# Patient Record
Sex: MALE | Race: OTHER | HISPANIC OR LATINO | Employment: UNEMPLOYED | URBAN - METROPOLITAN AREA
[De-identification: names, ages, dates, MRNs, and addresses within clinical notes are randomized per-mention and may not be internally consistent; named-entity substitution may affect disease eponyms.]

---

## 2023-03-17 ENCOUNTER — HOSPITAL ENCOUNTER (INPATIENT)
Facility: HOSPITAL | Age: 33
LOS: 4 days | Discharge: HOME | End: 2023-03-21
Attending: STUDENT IN AN ORGANIZED HEALTH CARE EDUCATION/TRAINING PROGRAM | Admitting: PSYCHIATRY & NEUROLOGY

## 2023-03-17 ENCOUNTER — HOSPITAL ENCOUNTER (EMERGENCY)
Facility: HOSPITAL | Age: 33
End: 2023-03-17
Attending: EMERGENCY MEDICINE

## 2023-03-17 VITALS
RESPIRATION RATE: 18 BRPM | OXYGEN SATURATION: 99 % | SYSTOLIC BLOOD PRESSURE: 138 MMHG | DIASTOLIC BLOOD PRESSURE: 84 MMHG | TEMPERATURE: 97.7 F | HEART RATE: 79 BPM | WEIGHT: 145 LBS

## 2023-03-17 DIAGNOSIS — Z00.8 MEDICAL CLEARANCE FOR PSYCHIATRIC ADMISSION: ICD-10-CM

## 2023-03-17 DIAGNOSIS — F39 MOOD DISORDER (HCC): Primary | ICD-10-CM

## 2023-03-17 DIAGNOSIS — R03.0 ELEVATED BLOOD PRESSURE READING: ICD-10-CM

## 2023-03-17 DIAGNOSIS — R45.851 SUICIDAL IDEATION: Primary | ICD-10-CM

## 2023-03-17 LAB
AMPHETAMINES SERPL QL SCN: NEGATIVE
BARBITURATES UR QL: NEGATIVE
BENZODIAZ UR QL: NEGATIVE
COCAINE UR QL: NEGATIVE
ETHANOL EXG-MCNC: 0 MG/DL
METHADONE UR QL: NEGATIVE
OPIATES UR QL SCN: NEGATIVE
OXYCODONE+OXYMORPHONE UR QL SCN: NEGATIVE
PCP UR QL: NEGATIVE
THC UR QL: NEGATIVE

## 2023-03-17 PROCEDURE — GZ59ZZZ INDIVIDUAL PSYCHOTHERAPY, PSYCHOPHYSIOLOGICAL: ICD-10-PCS | Performed by: PSYCHIATRY & NEUROLOGY

## 2023-03-17 PROCEDURE — GZHZZZZ GROUP PSYCHOTHERAPY: ICD-10-PCS | Performed by: PSYCHIATRY & NEUROLOGY

## 2023-03-17 RX ORDER — LORAZEPAM 2 MG/ML
2 INJECTION INTRAMUSCULAR EVERY 6 HOURS PRN
Status: DISCONTINUED | OUTPATIENT
Start: 2023-03-17 | End: 2023-03-21 | Stop reason: HOSPADM

## 2023-03-17 RX ORDER — DIPHENHYDRAMINE HYDROCHLORIDE 50 MG/ML
50 INJECTION INTRAMUSCULAR; INTRAVENOUS EVERY 6 HOURS PRN
Status: DISCONTINUED | OUTPATIENT
Start: 2023-03-17 | End: 2023-03-21 | Stop reason: HOSPADM

## 2023-03-17 RX ORDER — AMOXICILLIN 250 MG
1 CAPSULE ORAL DAILY PRN
Status: DISCONTINUED | OUTPATIENT
Start: 2023-03-17 | End: 2023-03-21 | Stop reason: HOSPADM

## 2023-03-17 RX ORDER — OLANZAPINE 5 MG/1
5 TABLET ORAL
Status: CANCELLED | OUTPATIENT
Start: 2023-03-17

## 2023-03-17 RX ORDER — HYDROXYZINE 50 MG/1
100 TABLET, FILM COATED ORAL
Status: DISCONTINUED | OUTPATIENT
Start: 2023-03-17 | End: 2023-03-21 | Stop reason: HOSPADM

## 2023-03-17 RX ORDER — ACETAMINOPHEN 325 MG/1
975 TABLET ORAL EVERY 6 HOURS PRN
Status: CANCELLED | OUTPATIENT
Start: 2023-03-17

## 2023-03-17 RX ORDER — MINERAL OIL AND PETROLATUM 150; 830 MG/G; MG/G
OINTMENT OPHTHALMIC 4 TIMES DAILY PRN
Status: DISCONTINUED | OUTPATIENT
Start: 2023-03-17 | End: 2023-03-21 | Stop reason: HOSPADM

## 2023-03-17 RX ORDER — BENZTROPINE MESYLATE 1 MG/1
1 TABLET ORAL 2 TIMES DAILY PRN
Status: CANCELLED | OUTPATIENT
Start: 2023-03-17

## 2023-03-17 RX ORDER — BISACODYL 10 MG
10 SUPPOSITORY, RECTAL RECTAL DAILY PRN
Status: DISCONTINUED | OUTPATIENT
Start: 2023-03-17 | End: 2023-03-21 | Stop reason: HOSPADM

## 2023-03-17 RX ORDER — OLANZAPINE 10 MG/1
5 INJECTION, POWDER, LYOPHILIZED, FOR SOLUTION INTRAMUSCULAR
Status: CANCELLED | OUTPATIENT
Start: 2023-03-17

## 2023-03-17 RX ORDER — HYDROXYZINE HYDROCHLORIDE 25 MG/1
100 TABLET, FILM COATED ORAL
Status: CANCELLED | OUTPATIENT
Start: 2023-03-17

## 2023-03-17 RX ORDER — ACETAMINOPHEN 325 MG/1
650 TABLET ORAL EVERY 6 HOURS PRN
Status: CANCELLED | OUTPATIENT
Start: 2023-03-17

## 2023-03-17 RX ORDER — ACETAMINOPHEN 325 MG/1
650 TABLET ORAL EVERY 4 HOURS PRN
Status: CANCELLED | OUTPATIENT
Start: 2023-03-17

## 2023-03-17 RX ORDER — TRAZODONE HYDROCHLORIDE 50 MG/1
50 TABLET ORAL
Status: CANCELLED | OUTPATIENT
Start: 2023-03-17

## 2023-03-17 RX ORDER — TRAZODONE HYDROCHLORIDE 50 MG/1
50 TABLET ORAL
Status: DISCONTINUED | OUTPATIENT
Start: 2023-03-17 | End: 2023-03-21 | Stop reason: HOSPADM

## 2023-03-17 RX ORDER — OLANZAPINE 10 MG/1
10 INJECTION, POWDER, LYOPHILIZED, FOR SOLUTION INTRAMUSCULAR
Status: CANCELLED | OUTPATIENT
Start: 2023-03-17

## 2023-03-17 RX ORDER — ACETAMINOPHEN 325 MG/1
650 TABLET ORAL EVERY 4 HOURS PRN
Status: DISCONTINUED | OUTPATIENT
Start: 2023-03-17 | End: 2023-03-21 | Stop reason: HOSPADM

## 2023-03-17 RX ORDER — MINERAL OIL AND PETROLATUM 150; 830 MG/G; MG/G
OINTMENT OPHTHALMIC 4 TIMES DAILY PRN
Status: CANCELLED | OUTPATIENT
Start: 2023-03-17

## 2023-03-17 RX ORDER — OLANZAPINE 10 MG/1
10 INJECTION, POWDER, LYOPHILIZED, FOR SOLUTION INTRAMUSCULAR
Status: DISCONTINUED | OUTPATIENT
Start: 2023-03-17 | End: 2023-03-21 | Stop reason: HOSPADM

## 2023-03-17 RX ORDER — HYDROXYZINE 50 MG/1
50 TABLET, FILM COATED ORAL
Status: DISCONTINUED | OUTPATIENT
Start: 2023-03-17 | End: 2023-03-21 | Stop reason: HOSPADM

## 2023-03-17 RX ORDER — BISACODYL 10 MG
10 SUPPOSITORY, RECTAL RECTAL DAILY PRN
Status: CANCELLED | OUTPATIENT
Start: 2023-03-17

## 2023-03-17 RX ORDER — MAGNESIUM HYDROXIDE/ALUMINUM HYDROXICE/SIMETHICONE 120; 1200; 1200 MG/30ML; MG/30ML; MG/30ML
30 SUSPENSION ORAL EVERY 4 HOURS PRN
Status: CANCELLED | OUTPATIENT
Start: 2023-03-17

## 2023-03-17 RX ORDER — OLANZAPINE 10 MG/1
10 TABLET ORAL
Status: DISCONTINUED | OUTPATIENT
Start: 2023-03-17 | End: 2023-03-21 | Stop reason: HOSPADM

## 2023-03-17 RX ORDER — BENZTROPINE MESYLATE 1 MG/ML
1 INJECTION INTRAMUSCULAR; INTRAVENOUS 2 TIMES DAILY PRN
Status: CANCELLED | OUTPATIENT
Start: 2023-03-17

## 2023-03-17 RX ORDER — HYDROXYZINE HYDROCHLORIDE 25 MG/1
50 TABLET, FILM COATED ORAL
Status: CANCELLED | OUTPATIENT
Start: 2023-03-17

## 2023-03-17 RX ORDER — ACETAMINOPHEN 325 MG/1
975 TABLET ORAL EVERY 6 HOURS PRN
Status: DISCONTINUED | OUTPATIENT
Start: 2023-03-17 | End: 2023-03-21 | Stop reason: HOSPADM

## 2023-03-17 RX ORDER — OLANZAPINE 5 MG/1
2.5 TABLET ORAL
Status: CANCELLED | OUTPATIENT
Start: 2023-03-17

## 2023-03-17 RX ORDER — BENZTROPINE MESYLATE 1 MG/1
1 TABLET ORAL 2 TIMES DAILY PRN
Status: DISCONTINUED | OUTPATIENT
Start: 2023-03-17 | End: 2023-03-21 | Stop reason: HOSPADM

## 2023-03-17 RX ORDER — OLANZAPINE 5 MG/1
10 TABLET ORAL
Status: CANCELLED | OUTPATIENT
Start: 2023-03-17

## 2023-03-17 RX ORDER — MAGNESIUM HYDROXIDE/ALUMINUM HYDROXICE/SIMETHICONE 120; 1200; 1200 MG/30ML; MG/30ML; MG/30ML
30 SUSPENSION ORAL EVERY 4 HOURS PRN
Status: DISCONTINUED | OUTPATIENT
Start: 2023-03-17 | End: 2023-03-21 | Stop reason: HOSPADM

## 2023-03-17 RX ORDER — HYDROXYZINE HYDROCHLORIDE 25 MG/1
25 TABLET, FILM COATED ORAL
Status: DISCONTINUED | OUTPATIENT
Start: 2023-03-17 | End: 2023-03-21 | Stop reason: HOSPADM

## 2023-03-17 RX ORDER — BISACODYL 10 MG
10 SUPPOSITORY, RECTAL RECTAL DAILY PRN
Status: DISCONTINUED | OUTPATIENT
Start: 2023-03-17 | End: 2023-03-17

## 2023-03-17 RX ORDER — HYDROXYZINE HYDROCHLORIDE 25 MG/1
25 TABLET, FILM COATED ORAL
Status: CANCELLED | OUTPATIENT
Start: 2023-03-17

## 2023-03-17 RX ORDER — AMOXICILLIN 250 MG
1 CAPSULE ORAL DAILY PRN
Status: CANCELLED | OUTPATIENT
Start: 2023-03-17

## 2023-03-17 RX ORDER — DIPHENHYDRAMINE HYDROCHLORIDE 50 MG/ML
50 INJECTION INTRAMUSCULAR; INTRAVENOUS EVERY 6 HOURS PRN
Status: CANCELLED | OUTPATIENT
Start: 2023-03-17

## 2023-03-17 RX ORDER — OLANZAPINE 10 MG/1
5 INJECTION, POWDER, LYOPHILIZED, FOR SOLUTION INTRAMUSCULAR
Status: DISCONTINUED | OUTPATIENT
Start: 2023-03-17 | End: 2023-03-21 | Stop reason: HOSPADM

## 2023-03-17 RX ORDER — BENZTROPINE MESYLATE 1 MG/ML
1 INJECTION INTRAMUSCULAR; INTRAVENOUS 2 TIMES DAILY PRN
Status: DISCONTINUED | OUTPATIENT
Start: 2023-03-17 | End: 2023-03-21 | Stop reason: HOSPADM

## 2023-03-17 RX ORDER — LORAZEPAM 2 MG/ML
2 INJECTION INTRAMUSCULAR EVERY 6 HOURS PRN
Status: CANCELLED | OUTPATIENT
Start: 2023-03-17

## 2023-03-17 RX ORDER — OLANZAPINE 2.5 MG/1
2.5 TABLET ORAL
Status: DISCONTINUED | OUTPATIENT
Start: 2023-03-17 | End: 2023-03-21 | Stop reason: HOSPADM

## 2023-03-17 RX ORDER — ACETAMINOPHEN 325 MG/1
650 TABLET ORAL EVERY 6 HOURS PRN
Status: DISCONTINUED | OUTPATIENT
Start: 2023-03-17 | End: 2023-03-21 | Stop reason: HOSPADM

## 2023-03-17 RX ORDER — OLANZAPINE 5 MG/1
5 TABLET ORAL
Status: DISCONTINUED | OUTPATIENT
Start: 2023-03-17 | End: 2023-03-21 | Stop reason: HOSPADM
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
## 2023-03-17 NOTE — ED NOTES
Roundtrip initiated at this time  [facility] P/U 2140    Transfer packet prepared  Patient informed of acceptance at this time  Patient verbalizes agreement with treatment at [facility]  Patient reports he does not currently have health insurance, and would benefit from help to secure CARLITOS Aguilar  Crisis Intervention Specialist II  03/17/23
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
## 2023-03-17 NOTE — ED NOTES
Patient is accepted at [facility]  Patient is accepted by Vero Gaona in Intake  Transportation is arranged with [facility]  Transportation is scheduled for 2130  Patient may go to the floor after 2130  Nurse report is to be called to [phone] prior to patient transfer      Wilfred Smallwood  Crisis Intervention Specialist II  03/17/23

## 2023-03-17 NOTE — EMTALA/ACUTE CARE TRANSFER
[facility]  [address] [facility] [address]  Dept: [phone]      EMTALA TRANSFER CONSENT    NAME Shaggy Mathew                                         1990                              MRN [id number]    I have been informed of my rights regarding examination, treatment, and transfer   by Dr Lynn Tello:  see psych    Risks:  worsening condition in route       Consent for Transfer:  I acknowledge that my medical condition has been evaluated and explained to me by the emergency department physician or other qualified medical person and/or my attending physician, who has recommended that I be transferred to the service of  Accepting Physician: Dr Colonel Hatchet at [address] Name, [address] : Janis CARL[facility]LE  The above potential benefits of such transfer, the potential risks associated with such transfer, and the probable risks of not being transferred have been explained to me, and I fully understand them  The doctor has explained that, in my case, the benefits of transfer outweigh the risks  I agree to be transferred  I authorize the performance of emergency medical procedures and treatments upon me in both transit and upon arrival at the receiving facility  Additionally, I authorize the release of any and all medical records to the receiving facility and request they be transported with me, if possible  I understand that the safest mode of transportation during a medical emergency is an ambulance and that the Hospital advocates the use of this mode of transport  Risks of traveling to the receiving facility by car, including absence of medical control, life sustaining equipment, such as oxygen, and medical personnel has been explained to me and I fully understand them  (DANIEL CORRECT BOX BELOW)  [  ]  I consent to the stated transfer and to be transported by ambulance/helicopter    [  ]  I consent to the stated transfer, but refuse transportation by ambulance and accept full responsibility for my transportation by car  I understand the risks of non-ambulance transfers and I exonerate the Hospital and its staff from any deterioration in my condition that results from this refusal     X___________________________________________    DATE  23  TIME________  Signature of patient or legally responsible individual signing on patient behalf           RELATIONSHIP TO PATIENT_________________________          Provider Certification    NAME Talita Cavanaugh                                         1990                              MRN [id number]    A medical screening exam was performed on the above named patient  Based on the examination:    Condition Necessitating Transfer The primary encounter diagnosis was Suicidal ideation  Diagnoses of Medical clearance for psychiatric admission and Elevated blood pressure reading were also pertinent to this visit  Patient Condition:  stable    Reason for Transfer:  SI/see psych    Transfer Requirements: [facility]   · Space available and qualified personnel available for treatment as acknowledged by Avi Ellington [phone]  · Agreed to accept transfer and to provide appropriate medical treatment as acknowledged by       Dr Gloria Mello  · Appropriate medical records of the examination and treatment of the patient are provided at the time of transfer   [address] [facility], [address] _______  · Transfer will be performed by qualified personnel from [address] [place] [address]  and appropriate transfer equipment as required, including the use of necessary and appropriate life support measures      Provider Certification: I have examined the patient and explained the following risks and benefits of being transferred/refusing transfer to the patient/family:         Based on these reasonable risks and benefits to the patient and/or the unborn child(paola), and based upon the information available at the time of the patient’s examination, I certify that the medical benefits reasonably to be expected from the provision of appropriate medical treatments at another medical facility outweigh the increasing risks, if any, to the individual’s medical condition, and in the case of labor to the unborn child, from effecting the transfer      X____________________________________________ DATE 03/17/23        TIME_______      ORIGINAL - SEND TO MEDICAL RECORDS   COPY - SEND WITH PATIENT DURING TRANSFER

## 2023-03-17 NOTE — ED NOTES
Full report given to [employer] RN at this time        Joe Art, HARJINDER  03/17/23 1949

## 2023-03-18 PROBLEM — Z00.8 MEDICAL CLEARANCE FOR PSYCHIATRIC ADMISSION: Status: ACTIVE | Noted: 2023-03-18

## 2023-03-18 PROBLEM — F39 MOOD DISORDER (HCC): Status: ACTIVE | Noted: 2023-03-18

## 2023-03-18 PROBLEM — Z72.0 TOBACCO USE: Status: ACTIVE | Noted: 2023-03-18

## 2023-03-18 LAB
25(OH)D3 SERPL-MCNC: 23.4 NG/ML (ref 30–100)
ALBUMIN SERPL BCP-MCNC: 4.3 G/DL (ref 3.5–5)
ALP SERPL-CCNC: 46 U/L (ref 34–104)
ALT SERPL W P-5'-P-CCNC: 12 U/L (ref 7–52)
ANION GAP SERPL CALCULATED.3IONS-SCNC: 4 MMOL/L (ref 4–13)
AST SERPL W P-5'-P-CCNC: 14 U/L (ref 13–39)
ATRIAL RATE: 61 BPM
BASOPHILS # BLD AUTO: 0.07 THOUSANDS/ÂΜL (ref 0–0.1)
BASOPHILS NFR BLD AUTO: 1 % (ref 0–1)
BILIRUB SERPL-MCNC: 0.61 MG/DL (ref 0.2–1)
BUN SERPL-MCNC: 17 MG/DL (ref 5–25)
CALCIUM SERPL-MCNC: 9.3 MG/DL (ref 8.4–10.2)
CHLORIDE SERPL-SCNC: 104 MMOL/L (ref 96–108)
CHOLEST SERPL-MCNC: 107 MG/DL
CO2 SERPL-SCNC: 30 MMOL/L (ref 21–32)
CREAT SERPL-MCNC: 1.06 MG/DL (ref 0.6–1.3)
EOSINOPHIL # BLD AUTO: 0.19 THOUSAND/ÂΜL (ref 0–0.61)
EOSINOPHIL NFR BLD AUTO: 3 % (ref 0–6)
ERYTHROCYTE [DISTWIDTH] IN BLOOD BY AUTOMATED COUNT: 11.9 % (ref 11.6–15.1)
EST. AVERAGE GLUCOSE BLD GHB EST-MCNC: 108 MG/DL
GFR SERPL CREATININE-BSD FRML MDRD: 93 ML/MIN/1.73SQ M
GLUCOSE P FAST SERPL-MCNC: 77 MG/DL (ref 65–99)
GLUCOSE SERPL-MCNC: 77 MG/DL (ref 65–140)
HBA1C MFR BLD: 5.4 %
HCT VFR BLD AUTO: 49 % (ref 36.5–49.3)
HDLC SERPL-MCNC: 50 MG/DL
HGB BLD-MCNC: 16.3 G/DL (ref 12–17)
IMM GRANULOCYTES # BLD AUTO: 0.03 THOUSAND/UL (ref 0–0.2)
IMM GRANULOCYTES NFR BLD AUTO: 0 % (ref 0–2)
LDLC SERPL CALC-MCNC: 44 MG/DL (ref 0–100)
LYMPHOCYTES # BLD AUTO: 2.1 THOUSANDS/ÂΜL (ref 0.6–4.47)
LYMPHOCYTES NFR BLD AUTO: 28 % (ref 14–44)
MCH RBC QN AUTO: 31.3 PG (ref 26.8–34.3)
MCHC RBC AUTO-ENTMCNC: 33.3 G/DL (ref 31.4–37.4)
MCV RBC AUTO: 94 FL (ref 82–98)
MONOCYTES # BLD AUTO: 0.56 THOUSAND/ÂΜL (ref 0.17–1.22)
MONOCYTES NFR BLD AUTO: 7 % (ref 4–12)
NEUTROPHILS # BLD AUTO: 4.63 THOUSANDS/ÂΜL (ref 1.85–7.62)
NEUTS SEG NFR BLD AUTO: 61 % (ref 43–75)
NONHDLC SERPL-MCNC: 57 MG/DL
NRBC BLD AUTO-RTO: 0 /100 WBCS
PLATELET # BLD AUTO: 213 THOUSANDS/UL (ref 149–390)
PMV BLD AUTO: 10.8 FL (ref 8.9–12.7)
POTASSIUM SERPL-SCNC: 4.2 MMOL/L (ref 3.5–5.3)
PROT SERPL-MCNC: 7.1 G/DL (ref 6.4–8.4)
QRS AXIS: 109 DEGREES
QRSD INTERVAL: 94 MS
QT INTERVAL: 388 MS
QTC INTERVAL: 390 MS
RBC # BLD AUTO: 5.21 MILLION/UL (ref 3.88–5.62)
SODIUM SERPL-SCNC: 138 MMOL/L (ref 135–147)
T WAVE AXIS: 115 DEGREES
TRIGL SERPL-MCNC: 64 MG/DL
TSH SERPL DL<=0.05 MIU/L-ACNC: 1.01 UIU/ML (ref 0.45–4.5)
VENTRICULAR RATE: 61 BPM
WBC # BLD AUTO: 7.58 THOUSAND/UL (ref 4.31–10.16)

## 2023-03-18 RX ADMIN — TRAZODONE HYDROCHLORIDE 50 MG: 50 TABLET ORAL at 21:30

## 2023-03-18 NOTE — ASSESSMENT & PLAN NOTE
• Admission labs reviewed, CBC, CMP, lipid panel acceptable  • Vitamin D 25-hydroxy, RPR, TSH, hemoglobin A1c labs pending  • Vitals stable  • UDS negative  • COVID-19 negative  • EKG reveals NSR, 61bpm   • Medically stable for continued inpatient psychiatric treatment based on available results  • Please contact [facility] with any questions or concerns
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
## 2023-03-18 NOTE — ED NOTES
Report received from previous RN  Patient resting comfortable in room  Awake and alert  Respirations appear even and non labored  Q15 minute safety checks continue, see paper charting for [address] observations  Patient aware he will be transported to [facility] at 2130  Will continue to monitor patient        Meg Martin RN  03/17/23 2019

## 2023-03-18 NOTE — TREATMENT PLAN
RN will meet with pt at least twice daily to assess any concerns and provide education on prescribed medications, diagnoses, and coping skills

## 2023-03-18 NOTE — ED NOTES
CTS arrived to transport patient to [facility] at this time   Belongings given to transport team       Dina Noel RN  03/17/23 2043

## 2023-03-18 NOTE — NURSING NOTE
[place]  Phone ( some scratches on back of phone upon arrival )  [place]    SECURITY 100 dollar bill TO SECURITY   SS CARD   PERMANENT RESIDENT ID  business card   [employer] above items in   [employer] debit [id number]  Laundry card    work ID   [employer] kors winter coat   Black baseball hat   Blue sweat pants ( strings inside)    No shirt or boxers upon arrival to unit

## 2023-03-18 NOTE — NURSING NOTE
Patient resting in bed, eyes closed, respirations regular, even and non-labored  Pt  arouses easily  No s/s of pain or distress noted  Personal items within reach  Q 7 minute safety checks maintained throughout shift
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
## 2023-03-18 NOTE — CONSULTS
[id number] [facility] 1990, 33 y o  male MRN: [id number]  Unit/Bed#: [facility] 260-02 Encounter: [id number]  Primary Care Provider: No primary care provider on file  Date and time admitted to hospital: 3/17/2023  9:27 PM    Inpatient consult for Medical Clearance for [address] patient  Consult performed by: Irma Guzmán PA-C  Consult ordered by: Pattie Hwang PA-C          Medical clearance for psychiatric admission  Assessment & Plan  • Admission labs reviewed, CBC, CMP, lipid panel acceptable  • Vitamin D 25-hydroxy, RPR, TSH, hemoglobin A1c labs pending  • Vitals stable  • UDS negative  • COVID-19 negative  • EKG reveals NSR, 61bpm   • Medically stable for continued inpatient psychiatric treatment based on available results  • Please contact SLIM with any questions or concerns      Tobacco use  Assessment & Plan  · Smokes  ·  on cessation  · Does not want NRT      VTE Prophylaxis:   Low Risk (Score 0-2) - Encourage Ambulation  Recommendations for Discharge:  · Discharge timeline per primary team   Routine follow-up with primary care provider   on cessation from tobacco use    Total Time Spent on Date of Encounter in care of patient: 25 minutes This time was spent on one or more of the following: performing physical exam; counseling and coordination of care; obtaining or reviewing history; documenting in the medical record; reviewing/ordering tests, medications or procedures; communicating with other healthcare professionals and discussing with patient's family/caregivers  Collaboration of Care: Were Recommendations Directly Discussed with Primary Treatment Team? No    History of Present Illness:  Shady Clark is a 33 y o  male who is originally admitted to the behavioral health service due to suicidal threats  We are consulted for management of chronic medical conditions     Patient denies any chronic medical conditions for which he takes daily medications  Patient should continue all prior to admission medications as prescribed by primary care provider/outpatient specialists  Patient denies recent travel, illness or sick contacts  Patient denies drinking or drug use  He does endorse smoking  Available admission lab work and vitals are acceptable  Patient feels a baseline physical health  Patient appears medically stable for inpatient psychiatric treatment at this time  Please contact SLIM with any medical questions or concerns if issues should arise  Review of Systems:  Review of Systems   Psychiatric/Behavioral: Positive for suicidal ideas  All other systems reviewed and are negative  Past Medical and Surgical History:   History reviewed  No pertinent past medical history  No past surgical history on file  Meds/Allergies:  PTA meds:   None       Allergies: No Known Allergies    Social History:  Marital Status: Single  Substance Use History:   Social History     Substance and Sexual Activity   Alcohol Use Yes    Comment: rarely drinks, last drink was 1 beer on New Years     Social History     Tobacco Use   Smoking Status Every Day   • Types: Cigars   • Start date: 2022   Smokeless Tobacco Never     Social History     Substance and Sexual Activity   Drug Use Never       Family History:  History reviewed  No pertinent family history  Physical Exam:   Vitals:   Blood Pressure: 122/72 (03/18/23 0735)  Pulse: 56 (03/18/23 0735)  Temperature: 97 8 °F (36 6 °C) (03/18/23 0735)  Temp Source: Tympanic (03/18/23 0735)  Respirations: 16 (03/18/23 0735)  Height: 5' 6" (167 6 cm) (03/17/23 2100)  Weight - Scale: 66 7 kg (147 lb) (03/17/23 2100)  SpO2: 99 % (03/17/23 2100)    Physical Exam  Vitals and nursing note reviewed  Constitutional:       General: He is awake  He is not in acute distress  HENT:      Head: Normocephalic and atraumatic  Cardiovascular:      Rate and Rhythm: Normal rate and regular rhythm  Heart sounds: No murmur heard  Pulmonary:      Effort: Pulmonary effort is normal       Breath sounds: Normal breath sounds  Abdominal:      General: There is no distension  Palpations: Abdomen is soft  Musculoskeletal:      Right lower leg: No edema  Left lower leg: No edema  Skin:     General: Skin is warm and dry  Neurological:      Mental Status: He is alert  Comments: CN II-XII grossly intact        Additional Data:   Lab Results:    Results from last 7 days   Lab Units 03/18/23  0612   WBC Thousand/uL 7 58   HEMOGLOBIN g/dL 16 3   HEMATOCRIT % 49 0   PLATELETS Thousands/uL 213   NEUTROS PCT % 61   LYMPHS PCT % 28   MONOS PCT % 7   EOS PCT % 3     Results from last 7 days   Lab Units 03/18/23  0612   SODIUM mmol/L 138   POTASSIUM mmol/L 4 2   CHLORIDE mmol/L 104   CO2 mmol/L 30   BUN mg/dL 17   CREATININE mg/dL 1 06   ANION GAP mmol/L 4   CALCIUM mg/dL 9 3   ALBUMIN g/dL 4 3   TOTAL BILIRUBIN mg/dL 0 61   ALK PHOS U/L 46   ALT U/L 12   AST U/L 14   GLUCOSE RANDOM mg/dL 77             No results found for: HGBA1C            Imaging: No pertinent imaging reviewed  No orders to display       EKG, Pathology, and Other Studies Reviewed on Admission:   · EKG: NSR  HR 61bpm,   ** Please Note: This note may have been constructed using a voice recognition system   **

## 2023-03-18 NOTE — TREATMENT PLAN
TREATMENT PLAN REVIEW - [address] [facility] 33 y o  1990 male MRN: [id number]    [address] [place] [address] Room / Bed: [facility] 260/[facility] 260-01 Encounter: [id number]          Admit Date/Time:  3/17/2023  9:27 PM    Treatment Team: Attending Provider: Arcelia Hancock MD; Consulting Physician: Catarina Gutiérrez MD; Registered Nurse: Danya Yao, RN; Patient Care Technician: [facility]; Charge Nurse: Dany Gary RN; Patient Care Technician: May Ladd; Registered Nurse: Colin Jaimes, RN; Registered Nurse: Matilde Gillespie, RN; Patient Care Technician: Radha Tam;  Registered Nurse: Billy Villafana RN; Patient Care Assistant: Adam Hurst    Diagnosis: Principal Problem:    Mood disorder [facility])  Active Problems:    Medical clearance for psychiatric admission    Tobacco use      Patient Strengths/Assets: average or above intelligence, capable of independent living, good physical health, patient is on a voluntary commitment, patient is willing to work on problems    Patient Barriers/Limitations: lack of stable employment, limited education, limited support system, poor insight    Short Term Goals: decrease in depressive symptoms, decrease in suicidal thoughts, decrease in self abusive behaviors, decrease in homicidal thoughts, decrease in level of agitation, mood stabilization, increase in group attendance, increase in socialization with peers on the unit, acceptance of need for psychiatric treatment, acceptance of psychiatric medications    Long Term Goals: improvement in depression, resolution of depressive symptoms, stabilization of mood, free of suicidal thoughts, free of homicidal thoughts, improved insight, acceptance of need for psychiatric medications, acceptance of need for psychiatric treatment, acceptance of need for psychiatric follow up after discharge, acceptance of psychiatric diagnosis, appropriate interaction with peers, appropriate interaction with family, stable living arrangements upon discharge, establishment of family support upon discharge    Progress Towards Goals: starting psychiatric medications as prescribed    Recommended Treatment: medication management, patient medication education, group therapy, milieu therapy, continued Behavioral Health psychiatric evaluation/assessment process    Treatment Frequency: daily medication monitoring, group and milieu therapy daily, monitoring through interdisciplinary rounds, monitoring through weekly patient care conferences    Expected Discharge Date:  TBD    Discharge Plan: return to previous living arrangement    Treatment Plan Created/Updated By: [place] C Holter, DO

## 2023-03-18 NOTE — PLAN OF CARE
Problem: SELF HARM/SUICIDALITY  Goal: Will have no self-injury during hospital stay  Description: INTERVENTIONS:  - Q 15 MINUTES: Routine safety checks  - Q WAKING SHIFT & PRN: Assess risk to determine if routine checks are adequate to maintain patient safety  - Encourage patient to participate actively in care by formulating a plan to combat response to suicidal ideation, identify supports and resources  Outcome: Progressing     Problem: DEPRESSION  Goal: Will be euthymic at discharge  Description: INTERVENTIONS:  - Administer medication as ordered  - Provide emotional support via 1:1 interaction with staff  - Encourage involvement in milieu/groups/activities  - Monitor for social isolation  Outcome: Progressing     Problem: SLEEP DISTURBANCE  Goal: Will exhibit normal sleeping pattern  Description: Interventions:  -  Assess the patients sleep pattern, noting recent changes  - Administer medication as ordered  - Decrease environmental stimuli, including noise, as appropriate during the night  - Encourage the patient to actively participate in unit groups and or exercise during the day to enhance ability to achieve adequate sleep at night  - Assess the patient, in the morning, encouraging a description of sleep experience  Outcome: Progressing

## 2023-03-18 NOTE — NURSING NOTE
#[id number]    Pt is resting in bed throughout the day  Reports feeling "so so", having some sadness  Denies anxiety or any SI, HI  Denies AVH or paranoia  Pt asking when they will discharged  Encouraged pt to follow up with treatment team and attending psychiatrist on Monday  Pt reports speaking with wife since admission  Denies further questions  Pt requesting to shower and provided with toiletries

## 2023-03-18 NOTE — PLAN OF CARE
Problem: Ineffective Coping  Goal: Participates in unit activities  Description: Interventions:  - Provide therapeutic environment   - Provide required programming   - Redirect inappropriate behaviors   Outcome: Not Progressing     Problem: SELF HARM/SUICIDALITY  Goal: Will have no self-injury during hospital stay  Description: INTERVENTIONS:  - Q 15 MINUTES: Routine safety checks  - Q WAKING SHIFT & PRN: Assess risk to determine if routine checks are adequate to maintain patient safety  - Encourage patient to participate actively in care by formulating a plan to combat response to suicidal ideation, identify supports and resources  Outcome: Progressing     Problem: DEPRESSION  Goal: Will be euthymic at discharge  Description: INTERVENTIONS:  - Administer medication as ordered  - Provide emotional support via 1:1 interaction with staff  - Encourage involvement in milieu/groups/activities  - Monitor for social isolation  Outcome: Progressing     Problem: SLEEP DISTURBANCE  Goal: Will exhibit normal sleeping pattern  Description: Interventions:  -  Assess the patients sleep pattern, noting recent changes  - Administer medication as ordered  - Decrease environmental stimuli, including noise, as appropriate during the night  - Encourage the patient to actively participate in unit groups and or exercise during the day to enhance ability to achieve adequate sleep at night  - Assess the patient, in the morning, encouraging a description of sleep experience  Outcome: Progressing     Problem: Nutrition/Hydration-ADULT  Goal: Nutrient/Hydration intake appropriate for improving, restoring or maintaining nutritional needs  Description: Monitor and assess patient's nutrition/hydration status for malnutrition  Collaborate with interdisciplinary team and initiate plan and interventions as ordered  Monitor patient's weight and dietary intake as ordered or per policy  Utilize nutrition screening tool and intervene as necessary  Determine patient's food preferences and provide high-protein, high-caloric foods as appropriate       INTERVENTIONS:  - Monitor oral intake, urinary output, labs, and treatment plans  - Assess nutrition and hydration status and recommend course of action  - Evaluate amount of meals eaten  - Assist patient with eating if necessary   - Allow adequate time for meals  - Recommend/ encourage appropriate diets, oral nutritional supplements, and vitamin/mineral supplements  - Order, calculate, and assess calorie counts as needed  - Recommend, monitor, and adjust tube feedings and TPN/PPN based on assessed needs  - Assess need for intravenous fluids  - Provide specific nutrition/hydration education as appropriate  - Include patient/family/caregiver in decisions related to nutrition  Outcome: Progressing
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
## 2023-03-19 LAB — TREPONEMA PALLIDUM IGG+IGM AB [PRESENCE] IN SERUM OR PLASMA BY IMMUNOASSAY: NORMAL

## 2023-03-19 RX ADMIN — TRAZODONE HYDROCHLORIDE 50 MG: 50 TABLET ORAL at 21:32

## 2023-03-19 NOTE — CMS CERTIFICATION NOTE
Recertification: Based upon physical, mental and social evaluations, I certify that inpatient psychiatric services continue to be medically necessary for this patient for a duration of greater than 2 midnights for the treatment of  Mood disorder ([facility] )    [place] C Holter

## 2023-03-19 NOTE — TREATMENT TEAM
03/19/23 0800   Team Meeting   Meeting Type Daily Rounds   Team Members Present   Team Members Present Physician;Nurse   Physician Team Member Dr Clemente Chambers Team Member Maria Teresa   Patient/Family Present   Patient Present No   Patient's Family Present No       Daily Rounds: Pt generally seclusive to room  Reports mood is so-so  Admits to depression, denies SI  Expresses readiness for discharge
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
## 2023-03-19 NOTE — NURSING NOTE
Pt OOB this afternoon, currently watching TV in Day Room  Pt asked if spoke with wife on the phone, pt states that they are "no more"  Have had multiple problems and that they are deciding to end the marriage  Pt denies being anxious, some sadness  Denies SANDEEP, HI, NOEL

## 2023-03-19 NOTE — NURSING NOTE
[place]  Phone ( some scratches on back of phone upon arrival )  [place]    SECURITY 100 dollar bill TO SECURITY   SS CARD   PERMANENT RESIDENT ID  business card   [employer] envelope above items in   [employer] debit [id number]  Laundry card    work ID   [employer] kors winter coat   Black baseball hat   Blue sweat pants ( strings inside)     No shirt or boxers upon arrival to unit       Bedside  White long sleeve  Black sweatshirt  [employer] blue pants  [employer] joggers

## 2023-03-19 NOTE — PLAN OF CARE
Problem: SELF HARM/SUICIDALITY  Goal: Will have no self-injury during hospital stay  Description: INTERVENTIONS:  - Q 15 MINUTES: Routine safety checks  - Q WAKING SHIFT & PRN: Assess risk to determine if routine checks are adequate to maintain patient safety  - Encourage patient to participate actively in care by formulating a plan to combat response to suicidal ideation, identify supports and resources  Outcome: Progressing     Problem: DEPRESSION  Goal: Will be euthymic at discharge  Description: INTERVENTIONS:  - Administer medication as ordered  - Provide emotional support via 1:1 interaction with staff  - Encourage involvement in milieu/groups/activities  - Monitor for social isolation  Outcome: Progressing     Problem: SLEEP DISTURBANCE  Goal: Will exhibit normal sleeping pattern  Description: Interventions:  -  Assess the patients sleep pattern, noting recent changes  - Administer medication as ordered  - Decrease environmental stimuli, including noise, as appropriate during the night  - Encourage the patient to actively participate in unit groups and or exercise during the day to enhance ability to achieve adequate sleep at night  - Assess the patient, in the morning, encouraging a description of sleep experience  Outcome: Progressing     Problem: Ineffective Coping  Goal: Participates in unit activities  Description: Interventions:  - Provide therapeutic environment   - Provide required programming   - Redirect inappropriate behaviors   Outcome: Progressing     Problem: Nutrition/Hydration-ADULT  Goal: Nutrient/Hydration intake appropriate for improving, restoring or maintaining nutritional needs  Description: Monitor and assess patient's nutrition/hydration status for malnutrition  Collaborate with interdisciplinary team and initiate plan and interventions as ordered  Monitor patient's weight and dietary intake as ordered or per policy  Utilize nutrition screening tool and intervene as necessary  Determine patient's food preferences and provide high-protein, high-caloric foods as appropriate       INTERVENTIONS:  - Monitor oral intake, urinary output, labs, and treatment plans  - Assess nutrition and hydration status and recommend course of action  - Evaluate amount of meals eaten  - Assist patient with eating if necessary   - Allow adequate time for meals  - Recommend/ encourage appropriate diets, oral nutritional supplements, and vitamin/mineral supplements  - Order, calculate, and assess calorie counts as needed  - Recommend, monitor, and adjust tube feedings and TPN/PPN based on assessed needs  - Assess need for intravenous fluids  - Provide specific nutrition/hydration education as appropriate  - Include patient/family/caregiver in decisions related to nutrition  Outcome: Progressing

## 2023-03-19 NOTE — NURSING NOTE
Pt requested prn medication for insomnia, prn Trazodone 50 mg administered at 2130  At this time, 2230, pt is observed lying in bed with eyes closed  Medication effective

## 2023-03-19 NOTE — PROGRESS NOTES
Psychiatric Progress Note - Department of [address] 33 y o  male MRN: [id number]  Unit/Bed#: [facility] 260-01 Encounter: [id number]    ASSESSMENT & PLAN     Principal Problem:    Mood disorder ([address] )  Active Problems:    Medical clearance for psychiatric admission    Tobacco use      • Continue to promote patient participation in therapeutic milieu  • Continue medical management per medicine  • Continue previously prescribed psychotropic medication regimen; see below  • Continue behavioral health checks q 7 minutes  • Continue vitals per behavioral health unit protocol  • Discharge date per primary team     SUBJECTIVE     Patient evaluated this a m  for continuity of care using Guatemalan translation, seeing as patient is predominantly Guatemalan-speaking  Patient was discussed at length with nursing and treatment team   Per nursing, it remains calm, cooperative although isolative at times in the milieu  no acute distress is noted throughout evaluation  Joseline Bobo denies suicidal/homicidal ideation; contracts for safety  Patient states that his mood is " so-so", admitting to slight depression, denying anxiety, although for his introduction of psychotropic medication spite lengthy psychoeducation and supportive therapy provided by this writer, remaining superficial relating to incident preceding present inpatient behavioral health admission, admitting to adequate appetite and sleep overnight  PSYCHIATRIC REVIEW OF SYSTEMS     Behavior over the last 24 hours:  unchanged  Sleep: normal  Appetite: normal  Medication side effects: No    REVIEW OF SYSTEMS   Review of systems: no complaints    OBJECTIVE     Vital signs in last 24 hours:  Temp:  [96 7 °F (35 9 °C)-97 7 °F (36 5 °C)] 96 7 °F (35 9 °C)  HR:  [61-68] 68  Resp:  [16-17] 17  BP: (116-128)/(70-74) 124/74    Laboratory results:    I have personally reviewed all pertinent laboratory/tests results    Most Recent Labs: Lab Results   Component Value Date    WBC 7 58 03/18/2023    RBC 5 21 03/18/2023    HGB 16 3 03/18/2023    HCT 49 0 03/18/2023     03/18/2023    RDW 11 9 03/18/2023    NEUTROABS 4 63 03/18/2023    SODIUM 138 03/18/2023    K 4 2 03/18/2023     03/18/2023    CO2 30 03/18/2023    BUN 17 03/18/2023    CREATININE 1 06 03/18/2023    GLUC 77 03/18/2023    GLUF 77 03/18/2023    CALCIUM 9 3 03/18/2023    AST 14 03/18/2023    ALT 12 03/18/2023    ALKPHOS 46 03/18/2023    TP 7 1 03/18/2023    ALB 4 3 03/18/2023    TBILI 0 61 03/18/2023    CHOLESTEROL 107 03/18/2023    HDL 50 03/18/2023    TRIG 64 03/18/2023    LDLCALC 44 03/18/2023    NONHDLC 57 03/18/2023    IPA3HXABGMLK 1 007 03/18/2023    HGBA1C 5 4 03/18/2023     03/18/2023     Labs in last 72 hours:   Recent Labs     03/18/23  0612   WBC 7 58   RBC 5 21   HGB 16 3   HCT 49 0      RDW 11 9   NEUTROABS 4 63   SODIUM 138   K 4 2      CO2 30   BUN 17   CREATININE 1 06   GLUC 77   GLUF 77   CALCIUM 9 3   AST 14   ALT 12   ALKPHOS 46   TP 7 1   ALB 4 3   TBILI 0 61   CHOLESTEROL 107   HDL 50   TRIG 64   LDLCALC 44   [id number] 1 007     Admission Labs:   Admission on 03/17/2023   Component Date Value   • Sodium 03/18/2023 138    • Potassium 03/18/2023 4 2    • Chloride 03/18/2023 104    • CO2 03/18/2023 30    • ANION GAP 03/18/2023 4    • BUN 03/18/2023 17    • Creatinine 03/18/2023 1 06    • Glucose 03/18/2023 77    • Glucose, Fasting 03/18/2023 77    • Calcium 03/18/2023 9 3    • AST 03/18/2023 14    • ALT 03/18/2023 12    • Alkaline Phosphatase 03/18/2023 46    • Total Protein 03/18/2023 7 1    • Albumin 03/18/2023 4 3    • Total Bilirubin 03/18/2023 0 61    • eGFR 03/18/2023 93    • WBC 03/18/2023 7 58    • RBC 03/18/2023 5 21    • Hemoglobin 03/18/2023 16 3    • Hematocrit 03/18/2023 49 0    • MCV 03/18/2023 94    • MCH 03/18/2023 31 3    • MCHC 03/18/2023 33 3    • RDW 03/18/2023 11 9    • MPV 03/18/2023 10 8    • Platelets 09/64/6063 213    • nRBC 03/18/2023 0    • Neutrophils Relative 03/18/2023 61    • Immat GRANS % 03/18/2023 0    • Lymphocytes Relative 03/18/2023 28    • Monocytes Relative 03/18/2023 7    • Eosinophils Relative 03/18/2023 3    • Basophils Relative 03/18/2023 1    • Neutrophils Absolute 03/18/2023 4 63    • Immature Grans Absolute 03/18/2023 0 03    • Lymphocytes Absolute 03/18/2023 2 10    • Monocytes Absolute 03/18/2023 0 56    • Eosinophils Absolute 03/18/2023 0 19    • Basophils Absolute 03/18/2023 0 07    • Cholesterol 03/18/2023 107    • Triglycerides 03/18/2023 64    • HDL, Direct 03/18/2023 50    • LDL Calculated 03/18/2023 44    • Non-HDL-Chol (CHOL-HDL) 03/18/2023 57    • TSH 3RD GENERATON 03/18/2023 1 007    • Hemoglobin A1C 03/18/2023 5 4    • EAG 03/18/2023 108    • Vit D, 25-Hydroxy 03/18/2023 23 4 (L)    • Ventricular Rate 03/18/2023 61    • Atrial Rate 03/18/2023 61    • QRSD Interval 03/18/2023 94    • QT Interval 03/18/2023 388    • QTC Interval 03/18/2023 390    • QRS Axis 03/18/2023 109    • T Wave Axis 03/18/2023 115        Behavioral Health Medications:   all current active meds have been reviewed, continue current psychiatric medications and current meds:   Current Facility-Administered Medications   Medication Dose Route Frequency   • acetaminophen (TYLENOL) tablet 650 mg  650 mg Oral Q6H PRN   • acetaminophen (TYLENOL) tablet 650 mg  650 mg Oral Q4H PRN   • acetaminophen (TYLENOL) tablet 975 mg  975 mg Oral Q6H PRN   • aluminum-magnesium hydroxide-simethicone (MYLANTA) oral suspension 30 mL  30 mL Oral Q4H PRN   • artificial tear (LUBRIFRESH P M ) ophthalmic ointment   Both Eyes 4x Daily PRN   • benztropine (COGENTIN) injection 1 mg  1 mg Intramuscular BID PRN   • benztropine (COGENTIN) tablet 1 mg  1 mg Oral BID PRN   • bisacodyl (DULCOLAX) rectal suppository 10 mg  10 mg Rectal Daily PRN   • hydrOXYzine HCL (ATARAX) tablet 50 mg  50 mg Oral Q6H PRN Max 4/day    Or   • diphenhydrAMINE (BENADRYL) injection 50 mg  50 mg Intramuscular Q6H PRN   • hydrOXYzine HCL (ATARAX) tablet 100 mg  100 mg Oral Q6H PRN Max 4/day    Or   • LORazepam (ATIVAN) injection 2 mg  2 mg Intramuscular Q6H PRN   • hydrOXYzine HCL (ATARAX) tablet 25 mg  25 mg Oral Q6H PRN Max 4/day   • influenza vaccine, quadrivalent (FLUARIX) IM injection 0 5 mL  0 5 mL Intramuscular Once PRN   • magnesium hydroxide (MILK OF MAGNESIA) oral suspension 30 mL  30 mL Oral Daily PRN   • OLANZapine (ZyPREXA) tablet 10 mg  10 mg Oral Q3H PRN Max 3/day    Or   • OLANZapine (ZyPREXA) IM injection 10 mg  10 mg Intramuscular Q3H PRN Max 3/day   • OLANZapine (ZyPREXA) tablet 5 mg  5 mg Oral Q3H PRN Max 6/day    Or   • OLANZapine (ZyPREXA) IM injection 5 mg  5 mg Intramuscular Q3H PRN Max 6/day   • OLANZapine (ZyPREXA) tablet 2 5 mg  2 5 mg Oral Q3H PRN Max 8/day   • senna-docusate sodium (SENOKOT S) 8 6-50 mg per tablet 1 tablet  1 tablet Oral Daily PRN   • traZODone (DESYREL) tablet 50 mg  50 mg Oral HS PRN         Current Facility-Administered Medications   Medication Dose Route Frequency Provider Last Rate   • acetaminophen  650 mg Oral Q6H PRN Sparkle Catching, PA-C     • acetaminophen  650 mg Oral Q4H PRN [place] Catching, PA-C     • acetaminophen  975 mg Oral Q6H PRN Sparkle Catching, PA-C     • aluminum-magnesium hydroxide-simethicone  30 mL Oral Q4H PRN [place] Catching, PA-C     • artificial tear   Both Eyes 4x Daily PRN Sparkle Catching, PA-C     • benztropine  1 mg Intramuscular BID PRN Sparkle Catching, PA-C     • benztropine  1 mg Oral BID PRN Sparkle Catching, PA-C     • bisacodyl  10 mg Rectal Daily PRN Jolynn Lopez MD     • hydrOXYzine HCL  50 mg Oral Q6H PRN Max 4/day [place] Catching, PA-C      Or   • diphenhydrAMINE  50 mg Intramuscular Q6H PRN Sparkle Catching, PA-C     • hydrOXYzine HCL  100 mg Oral Q6H PRN Max 4/day Sparkle Goldstein PA-C      Or   • LORazepam  2 mg Intramuscular Q6H PRN Sparkle Goldstein PA-C     • hydrOXYzine HCL 25 mg Oral Q6H PRN Max 4/day [facility]     • influenza vaccine  0 5 mL Intramuscular Once PRN Ruddy Martinez MD     • magnesium hydroxide  30 mL Oral Daily PRN Ruddy Martinez MD     • OLANZapine  10 mg Oral Q3H PRN Max 3/day [facility], PA-C      Or   • OLANZapine  10 mg Intramuscular Q3H PRN Max 3/day [facility], PA-C     • OLANZapine  5 mg Oral Q3H PRN Max 6/day [facility], PA-UMA      Or   • OLANZapine  5 mg Intramuscular Q3H PRN Max 6/day [facility], PA-C     • OLANZapine  2 5 mg Oral Q3H PRN Max 8/day [facility], PA-C     • senna-docusate sodium  1 tablet Oral Daily PRN [facility]     • traZODone  50 mg Oral HS PRN [facility], PA-C         Risks, benefits and possible side effects of Medications:   Risks, benefits, and possible side effects of medications explained to patient and patient verbalizes understanding        Mental Status Evaluation:  Appearance:  age appropriate, casually dressed and disheveled   Behavior:  psychomotor retardation, superficial   Speech:  soft and scant   Mood:  depressed and dysthymic   Affect:  constricted   Language naming objects and repeating phrases   Thought Process:  goal directed   Thought Content:  normal   Perceptual Disturbances: None   Risk Potential: Suicidal Ideations none, Homicidal Ideations none and Potential for Aggression No   Sensorium:  person, place and time/date   Cognition:  recent and remote memory grossly intact   Consciousness:  alert and awake    Attention: attention span appeared shorter than expected for age   Insight:  limited   Judgment: limited   Intellect    Gait/Station: normal gait/station and normal balance   Motor Activity: no abnormal movements     Memory: Short and long term memory  fair     Progress Toward Goals: unchanged, as evidenced by their participation (or lack thereof) in individual, social and therapeutic milieu in addition to adherence to their medication regimen  Recommended Treatment:   See above for assessment and plan  Inpatient Psychiatric Certification: Estimated length of stay: More than 2 midnights  Note Share: This note was not shared with the patient due to reasonable likelihood of causing patient harm     This note has been constructed using a voice recognition system  There may be translation, syntax,  or grammatical errors  If you have any questions, please contact the dictating provider      [place] C Holter, DO

## 2023-03-20 RX ADMIN — TRAZODONE HYDROCHLORIDE 50 MG: 50 TABLET ORAL at 21:27
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
## 2023-03-20 NOTE — CASE MANAGEMENT
CM reached out to patient's sister in law, Beth Santiago, to discuss discharge  Beth Santiago informed CM that she would be able to  patient after 7 pm, once she leaves work  CM and Beth Santiago discussed any previous concerns with patient and possibly self-harm or harming others  Beth Santiago explained that patient did not have a history of SI or self-harm or harming others  These behaviors are new  At this time Beth Santiago did not express concerns with patient hurting himself or other, but would like patient to attend therapy once he moves to [place]
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
## 2023-03-20 NOTE — NURSING NOTE
Interpretor #[id number]    Pt denies SI/HI, depression  Pt states having mild anxiety related to worrying about his Father who was hospitalized and supposed to discharged from hospital today  Pt states he was hopeful to discharge today however understanding that he probably wont be until at least tomorrow  Pt remains mostly withdrawn to room  States he plans on living with brother and sister-in-law after discharge  Pt states his sister-in-law is supportive of patient

## 2023-03-20 NOTE — DISCHARGE INSTR - APPOINTMENTS
You are being discharged to your confirmed address of [address] [place] [address]  You will be contacted at your confirmed phone number of [phone]  Ace Hutchinson or Nellie, marnie Miles and Terell, will be calling you after your discharge, on the phone number that you provided  They will be available as an additional support, if needed  If you wish to speak with one of them, you may contact Arnav Zhou at [phone] or Magda Winslow at [phone]

## 2023-03-20 NOTE — PLAN OF CARE
Problem: DISCHARGE PLANNING - CARE MANAGEMENT  Goal: Discharge to post-acute care or home with appropriate resources  Description: INTERVENTIONS:  - Conduct assessment to determine patient/family and health care team treatment goals, and need for post-acute services based on payer coverage, community resources, and patient preferences, and barriers to discharge  - Address psychosocial, clinical, and financial barriers to discharge as identified in assessment in conjunction with the patient/family and health care team  - Arrange appropriate level of post-acute services according to patient’s   needs and preference and payer coverage in collaboration with the physician and health care team  - Communicate with and update the patient/family, physician, and health care team regarding progress on the discharge plan  - Arrange appropriate transportation to post-acute venues  Outcome: [place]  - CM reviewed Intake, MARY CARMEN's, and Treatment Plan with patient

## 2023-03-20 NOTE — NURSING NOTE
Patient requested and received Trazodone 50mg PRN for sleep at 2132  Medication effective, patient slept overnight without restlessness / distress observed
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
## 2023-03-20 NOTE — NURSING NOTE
This writer spoke with patient via  #[id number]  Thu Cough is pleasant upon approach, visualized ambulating in the hallways and socializing with select Israeli speaking peers in the dayroom  Patient denies current SI/HI/AH/VH, reports sleep and mood are both good, endorses feeling well and expresses readiness for discharge tomorrow  Thu Cough was encouraged to seek staff with any issues and/or concerns, verbalized understanding

## 2023-03-20 NOTE — DISCHARGE INSTR - OTHER ORDERS
The [facility] Mental [facility]ness ([facility]) offers various education & support groups for you & your family  For more information visit the website at    http://www Fastmobile/    Dial 2-1-1 to get connected/get help  Free, confidential information & referral available 24/7: Aging Services, Child & Youth Services, Counseling, Education/Training, Food/Shelter/Clothing, Health Services, Parenting, Substance Abuse, Support Groups, Volunteer Opportunities, & much more  Phone: 2-1-1 or 200-131-3460, Web: AGUSTIN LU081XRSU AGUEDA, Email: Yvette@google com      Support & Referral Helpline   Support and Referral Helpline is a 24-hour, 7 days-a-week, listening and referral service provided to all of [place]  Please call [phone] or tty [phone] to speak with one of our specialists  The helpline is possible through partnerships with the [employer]  The [employer] (formerly known as the [employer]) provides free and confidential emotional support to people in suicidal crisis or emotional distress 24 hours a day, 7 days a week, across the [place]  The Lifeline is comprised of a national network of over 200 local crisis centers, combining custom local care and resources with national standards and best practices  Mental Health Facilities in [place], [place]  Below is the full listing of mental health facilities in [place], [place]  Many of the treatment centers also provide co-occurring mental health and addiction treatment for dual diagnosis disorders for those who struggle with alcohol abuse or drug addiction   Mental health centers in [place], [place] provide residential and outpatient treatment for various mental health issues including eating disorders such as bulimia and anorexia, panic disorders, schizophrenia, ADHD, PTSD, mood disorders, depression, personality disorders, phobias and other mental illness  Call [phone] to get 24/7 help with treatment  SPONSORED AD  [facility] is a mental health treatment center in [place], [place], located at [address] [place] [address], [address] [place] [address] code area  [place] [address] provides residential treatment, partial hospitalization/day treatment and outpatient treatment  [place] [address] offers psychotropic medication, group therapy and individual psychotherapy to seniors 72 or older, children / adolescents and adults  [place] [address] also supports people with HIV or AIDS, people with serious mental illness and people requiring dual diagnosis treatment  Additional services at [place] [address] consist of supported housing, court-ordered outpatient treatment and intensive case management  Mark [address] [place] [address] [facility] is a mental health clinic in [place], [place], located at [address] offers outpatient treatment  [facility] provides group therapy, trauma therapy and individual psychotherapy to children / adolescents  Some other services provided by [facility] include family psychoeducation and case management  Faiza [address] DEPT [address] [facility] [place] is a mental health facility in [place], [place], located at [address] [place] [address], in the [address] zip code   [address] provides outpatient treatment and telemedicine/telehealth  [address] [place] [address] provides cognitive behavioral therapy, individual psychotherapy and psychotropic medication to adults, seniors 72 or older and young adults  [address] [place] [address] also supports patients with eating disorders, people with HIV or AIDS and veterans  Some other services available at [address] [place] [address] include integrated primary care services, diet and exercise counseling and suicide prevention services  [facility] is one of mental health facilities in [place], [place], located at [address] zip code  [address] [place], [place] [address]e [address] provides outpatient treatment  [facility] [address] [place] [address] provides psychotropic medication to young adults, adults and seniors 72 or older  [facility] [address] [place] [address] also supports people with serious mental illness  Additional services provided at [address] [place] [address] include case management  [facility] [place] From [place] [address] [facility] is a mental health clinic in [place], [place], located at [place], [place] zip code area  Outpatient [facility] provides outpatient treatment and partial hospitalization/day treatment  Outpatient [facility] offers couples/family therapy, cognitive behavioral therapy and dialectical behavior therapy to adults, young adults and children / adolescents   1124 [facility] also supports children/adolescents with serious emotional disturbance, transitional age young adults and veterans  Additional services offered at [place] [address] include suicide prevention services, family psychoeducation and court-ordered outpatient treatment  [facility] - 2 1 [place] From [facility], [address] ARSH Weiss is a mental health treatment clinic in [place], [place], located at [facility], in the [address] [place] [address] zip code  [facility] ARSH [facility] provides partial hospitalization/day treatment and outpatient treatment  [facility] ARSH Weiss provides couples/family therapy, individual psychotherapy and trauma therapy to adults, seniors 72 or older and young adults  Additional services provided by [facility] ARSH Weiss consist of family psychoeducation  [facility] - 3 2 [place] From [facility], [address] [place] [address] is a mental health facility in [place], [place], located at [address] [place] [address], [address] [place] [address] code area  [facility] provides partial hospitalization/day treatment and outpatient treatment  [facility] provides group therapy, cognitive behavioral therapy and activity therapy to seniors 65 or older, young adults and adults  [facility] also supports people requiring dual diagnosis treatment and people with serious mental illness  Additional services offered by [address] [place] [address] consist of case management, diet and exercise counseling and suicide prevention services    [facility] - 3 [address] [place] From [facility], [address] [place] [address] Healthcare Residential Program is a mental health treatment center in [facility], located at [address] [facility], part of the [address] zip code  [facility] Program provides residential treatment  [facility] Prudencio Vicente also offers psychotropic medication, integrated dual diagnosis disorder treatment and individual psychotherapy to young adults, seniors 72 or older and adults  [facility] [employer] Program supports LGBT, people with serious mental illness and people with PTSD  [facility] [facility] also provides integrated primary care services, diet and exercise counseling and intensive case management  905 [facility] - 3 7 [facility] From [facility], [place] [address] is a mental health clinic in [address] [place] [address], located at [facility], [address] [place] [address], [address] [place] [address], in the [address] zip code  [place] [address] provides outpatient treatment  [place] [address] offers couples/family therapy, behavior modification and dialectical behavior therapy to adults, young adults and seniors 72 or older  More services at [place] [address] include illness management and recovery, family psychoeducation and case management  34[address] [place] [address] 8 [place] From [facility] Health/consultation Center is a mental health center in [facility], located at [address] [place] [address], [address], [address] zip code  [address] [place] [address]/consultation Center provides outpatient treatment   [address] [place] [address]/consultation [place] offers individual psychotherapy, trauma therapy and psychotropic medication to seniors 72 or older and adults  [facility]/consultation [place] also supports LGBT, people requiring dual diagnosis treatment and seniors or older adults  More services available at [address] [place] [address]/[facility] include case management and psychosocial rehabilitation services  [place] - 3 8 [place] From [facility], [address] [place] [address] is a mental health treatment center in [place], [place], located at [facility], Floor 6, [facility] [place] [facility] zip code area  400 [facility] provides telemedicine/telehealth and outpatient treatment  [address] [place] [address] offers dialectical behavior therapy, group therapy and couples/family therapy to seniors 65 or older, adults and children / adolescents  [address] [place] [address] also supports LGBT, people with serious mental illness and transitional age young adults  Additional services at [address] [place] [address] consist of family psychoeducation, illness management and recovery and chronic disease/illness management  Shyam Aggarwal - 4 0 [place] From [facility], [address] is a mental health clinic in [facility], located at [address] [place], [place]  Andreina Zarate  offers outpatient treatment  Andreina Knox provides psychotropic medication, cognitive behavioral therapy and dialectical behavior therapy to young adults, seniors 72 or older and children / adolescents  Andreina Knox also supports people with HIV or AIDS  Some other services provided by Andreina Knox include family psychoeducation, case management and integrated primary care services    [facility] - 4 0 [place] From [facility], 2[facility] Counseling Service is a mental health facility in [place], [place], located at [address] [facility], in the [place] [address] zip code  [facility] Counsel[facility] Service provides outpatient treatment  [facility] Service provides trauma therapy, individual psychotherapy and cognitive behavioral therapy to seniors 72 or older, young adults and children / adolescents  Some other services available at [address] [place] [address] include family psychoeducation  [address] - 4 1 [place] From [facility], [address] [place] [address] is one of mental health facilities in [place], [place], located at [address] [facility], 2710 [facility], [address] [place] [address] zip code  3651 [facility] provides outpatient treatment  3651 [facility] provides individual psychotherapy, trauma therapy and group therapy to seniors 72 or older, adults and young adults  Additional services provided at 3651 [facility] include suicide prevention services and family psychoeducation  [address] - 4 1 [place] From [facility], [facility] is a mental health clinic in [address] [place] [address], located at [address], 4th Floor, \[facility] [place] [facility]\"" zip code area  [facility] provides outpatient treatment  [facility] offers psychotropic medication, cognitive behavioral therapy and group therapy to children / adolescents and young adults  [address] also supports transitional age young adults, patients with eating disorders and people with PTSD  Additional services offered at [place] [address] include family psychoeducation    Call ) [phone] to get 24/7 help with treatment  SPONSORED AD  Frequently Asked Questions  Your mental health is important to improving your life and making you healthy and happy for years to come  Finding the answers you need to common questions about mental health treatment in [facility], [place] H[place] will make it easier to take care of yourself  Some common questions and answers you may have to mental health include:  Is Inpatient Residential Treatment in [place] [address] Outpatient? For some patients, residential treatment in [facility] is a better option to help handle the mental illness  Inpatient treatment provides the patient with full-time recovery since they move into the facility for one to three months  They will eat there, sleep there, and meet others working on their mental health, making it very effective  Not all patients need inpatient treatment though  Inpatient treatment is best for severe mental health disorders, especially if nothing else has worked  Many minor mental illnesses like anxiety and depression do best with outpatient treatment  Is Mental Health Treatment in [facility] Covered By [employer]? Thanks to the Affordable Care Act, the major medical insurance providers will help with the costs of mental health treatment in [facility]  The major providers like Omid and Costa lew, along with other [employer] and even Medicaid, provide a level of coverage to the patients who need them  The exact amount of coverage will depend on the policy in place and the current premium  What Kind of Mental Health Disorders Can You Get Treatment For in [facility]? All types of mental health disorders can be treated in [facility]  Treating the mental health disorder is a great way to help the patient get better and not have to deal with the long-term implications   Some of the mental health disorders that can receive treatment include: bipolar disorder, depression (both minor and major), anxiety of all kinds, dissociative disorders, eating disorders like anorexia and bulimia, schizophrenia, PTSD, ADHD, mood disorders, binge eating disorder, BPD (borderline personality disorder), panic disorder and all phobias  Can Mental Health Clinics in [facility] Treat Dual Diagnosis? Mental health clinics in [facility] are able to help you with dual diagnosis treatment  The dual diagnosis happens when the patient has a mental health condition and an addiction to drugs or alcohol  Most mental health clinics can work on both the illness and addiction at the same time, but the patient should double-check before they choose one  If either disorder is left untreated, it sets the patient up for failure with getting better overall  If there is a specific mental health disorder and addiction that needs treated, the patient can look for one that can handle those specifically  Who Can Benefit from [address] in [facility]? Everyone can benefit from mental health treatment, whether they reside in [facility] or in other parts of [place] or elsewhere in the country  Teenagers, adolescents or adults with severe forms of mental illness will benefit the most, especially if they attend inpatient residential treatment  These severe mental conditions include bipolar disorder, depression, and schizophrenia  Less severe forms of mental illness deserve some treatment too  Those with anxiety or depression can consider outpatient therapy  This gives them time to work one on one with a therapist, learning more about the condition and how they can get better  How Much Do Mental Health Facilities in [facility]? The cost of mental health care in [facility] will depend on the type of facility you attend  Inpatient treatment is more in-depth and includes sleeping and eating arrangements, meaning it will cost more overall compared to outpatient treatment   On average, one month of inpatient treatment in [place] will cost $10,000 before insurance  There are also luxury facilities that can cost a lot more each month  For outpatient therapy, the costs are usually per hour, ranging from $65 to $250 per hour  Patients will often need one to two sessions a week per session  Insurance can help to lower these costs and make it more affordable  What Treatment Methodologies are Used During Mental Health Treatment in [place]? There are different methodologies that are used to help with mental health treatment in [place], but they can usually be categorized as somatic or psychotherapeutic  The somatic options are going to include drugs, electroconvulsive therapy, and other options that are going to stimulate the brain  If the patient is given an anti-depressant, then they will be given a somatic option, for example  There are also psychotherapeutic treatments  Therapy for marriages, family, or groups can fit in here, hypnotherapy, and behavior therapy techniques, like exposure therapy and relaxation therapy  For most patients, the most effective treatment will be one that can successfully merge both of these types of treatment into one  Some patients will need medication to help them manage their condition while also going through some form of therapy to help when the condition tries to show up again  ?

## 2023-03-20 NOTE — PROGRESS NOTES
Reviewed Diagnosis of: Principal Problem:    Mood disorder ([facility] [address] )  Active Problems:    Medical clearance for psychiatric admission    Tobacco use    Reviewed Short Term Goals of: decrease in depressive symptoms, decrease in suicidal thoughts, decrease in self abusive behaviors, decrease in homicidal thoughts, decrease in level of agitation, mood stabilization, increase in group attendance, increase in socialization with peers on the unit, acceptance of need for psychiatric treatment, acceptance of psychiatric medications    All parties in agreement         03/20/23 0110   Team Meeting   Meeting Type Tx Team Meeting   Team Members Present   Team Members Present Physician;Nurse;   Physician Team Member Flori Dubois Team Member [facility] Team Member Aliyah Zamudio   Patient/Family Present   Patient Present No   Patient's Family Present No

## 2023-03-21 VITALS
SYSTOLIC BLOOD PRESSURE: 124 MMHG | DIASTOLIC BLOOD PRESSURE: 98 MMHG | BODY MASS INDEX: 23.99 KG/M2 | RESPIRATION RATE: 18 BRPM | HEART RATE: 105 BPM | WEIGHT: 149.25 LBS | OXYGEN SATURATION: 99 % | HEIGHT: 66 IN | TEMPERATURE: 97.8 F

## 2023-03-21 PROBLEM — Z00.8 MEDICAL CLEARANCE FOR PSYCHIATRIC ADMISSION: Status: RESOLVED | Noted: 2023-03-18 | Resolved: 2023-03-21

## 2023-03-21 NOTE — NURSING NOTE
Patient appears to have slept overnight without interruption or periods of restlessness noted  Patient is still sleeping now  Q7 minute observational rounds maintained for promotion of patient safety

## 2023-03-21 NOTE — PLAN OF CARE
Problem: SELF HARM/SUICIDALITY  Goal: Will have no self-injury during hospital stay  Description: INTERVENTIONS:  - Q 15 MINUTES: Routine safety checks  - Q WAKING SHIFT & PRN: Assess risk to determine if routine checks are adequate to maintain patient safety  - Encourage patient to participate actively in care by formulating a plan to combat response to suicidal ideation, identify supports and resources  Outcome: Adequate for Discharge     Problem: DEPRESSION  Goal: Will be euthymic at discharge  Description: INTERVENTIONS:  - Administer medication as ordered  - Provide emotional support via 1:1 interaction with staff  - Encourage involvement in milieu/groups/activities  - Monitor for social isolation  Outcome: Adequate for Discharge     Problem: SLEEP DISTURBANCE  Goal: Will exhibit normal sleeping pattern  Description: Interventions:  -  Assess the patients sleep pattern, noting recent changes  - Administer medication as ordered  - Decrease environmental stimuli, including noise, as appropriate during the night  - Encourage the patient to actively participate in unit groups and or exercise during the day to enhance ability to achieve adequate sleep at night  - Assess the patient, in the morning, encouraging a description of sleep experience  Outcome: Adequate for Discharge     Problem: Ineffective Coping  Goal: Participates in unit activities  Description: Interventions:  - Provide therapeutic environment   - Provide required programming   - Redirect inappropriate behaviors   Outcome: Adequate for Discharge     Problem: Nutrition/Hydration-ADULT  Goal: Nutrient/Hydration intake appropriate for improving, restoring or maintaining nutritional needs  Description: Monitor and assess patient's nutrition/hydration status for malnutrition  Collaborate with interdisciplinary team and initiate plan and interventions as ordered  Monitor patient's weight and dietary intake as ordered or per policy   Utilize nutrition screening tool and intervene as necessary  Determine patient's food preferences and provide high-protein, high-caloric foods as appropriate  INTERVENTIONS:  - Monitor oral intake, urinary output, labs, and treatment plans  - Assess nutrition and hydration status and recommend course of action  - Evaluate amount of meals eaten  - Assist patient with eating if necessary   - Allow adequate time for meals  - Recommend/ encourage appropriate diets, oral nutritional supplements, and vitamin/mineral supplements  - Order, calculate, and assess calorie counts as needed  - Recommend, monitor, and adjust tube feedings and TPN/PPN based on assessed needs  - Assess need for intravenous fluids  - Provide specific nutrition/hydration education as appropriate  - Include patient/family/caregiver in decisions related to nutrition  Outcome: Adequate for Discharge     Problem: DISCHARGE PLANNING - CARE MANAGEMENT  Goal: Discharge to post-acute care or home with appropriate resources  Description: INTERVENTIONS:  - Conduct assessment to determine patient/family and health care team treatment goals, and need for post-acute services based on payer coverage, community resources, and patient preferences, and barriers to discharge  - Address psychosocial, clinical, and financial barriers to discharge as identified in assessment in conjunction with the patient/family and health care team  - Arrange appropriate level of post-acute services according to patient’s   needs and preference and payer coverage in collaboration with the physician and health care team  - Communicate with and update the patient/family, physician, and health care team regarding progress on the discharge plan  - Arrange appropriate transportation to post-acute venues  Outcome: Adequate for Discharge     Expressed readiness for discharge

## 2023-03-21 NOTE — CASE MANAGEMENT
CM spoke with patient's ex-girlfriend regarding discharge, Suzi Son stated that she can accept patient at her house until patient's sister in law from [place] picks him up later this evening

## 2023-03-21 NOTE — BH TRANSITION RECORD
Contact Information: If you have any questions, concerns, pended studies, tests and/or procedures, or emergencies regarding your inpatient behavioral health visit  Please contact [address] [place] [address] behavioral health unit [phone] and ask to speak to a , nurse or physician  A contact is available 24 hours/ 7 days a week at this number  Summary of Procedures Performed During your Stay:  Below is a list of major procedures performed during your hospital stay and a summary of results:  - Cardiac Procedures/Studies: ekg - Normal sinus rhythm with sinus arrhythmia  Pending Studies (From admission, onward)    None        Please follow up on the above pending studies with your PCP and/or referring provider

## 2023-03-21 NOTE — TREATMENT TEAM
03/21/23 0818   Activity/Group Checklist   Group Admission/Discharge   Attendance Attended   Attendance Duration (min) 0-15   Interactions Interacted appropriately   Affect/Mood Appropriate   Goals Achieved Identified relapse prevention strategies; Discussed coping strategies; Identified resources and support systems; Able to listen to others; Able to engage in interactions; Other (Comment)  (pt filled out the Pakistani version of relapse prevention plan with this therapist and Pakistani speaking CM to translate and be avaialable for support  no questions or concerns once cmplete   copy placed in DC folder)

## 2023-03-21 NOTE — CASE MANAGEMENT
CM received a call back from patient's sister in law, Catrachita Jacques stated that she can pick patient up from the hospital in the evening, once she is done working  CM informed Marlyn Kovacs that patient had plans to discharge to his previous residence in [facility], where his ex-girlfriend resides  Marlyn Kovacs stated that she will confirm with the ex-girlfriend to confirm that she would be accepting patient in her home and call CM back with updated information

## 2023-03-21 NOTE — PROGRESS NOTES
Patient is described as presenting with some anxiety due to his father being hospitalized  Patient is described as pleasant and cooperative  Patient denies SI/HI/AH/VH  Discharge Date: 3/21/23     03/21/23 0750   Team Meeting   Meeting Type Daily Rounds   Team Members Present   Team Members Present Physician;Nurse;; Other (Discipline and Name)   Physician Team Member Janneth/ MIAH Gifford/ MIAH Ward/ PA Students   Nursing Team Member Les/ Nursing Students   Care Management Team Member Wilber/ Gabrielle/ James Roy   Other (Discipline and Name) Art Therapist, Alfredo/ Art Therapy Intern   Patient/Family Present   Patient Present No   Patient's Family Present No

## 2023-03-21 NOTE — CASE MANAGEMENT
CM met with patient to discuss discharge  Patient was informed that his family would prefer to pick him up directly from the hospital  Patient stated that he did not want to wait and wanted to go to his ex-girlfriend's home instead and have the family pick him up from there  Patient was adamant that his family could not stop him from going to his old home with his ex girlfriend  CM contacted sister in law, Altagracia Elizalde, and explained the patient's plans  At this time Altagracia Elizalde is aware of the patient's plans and will coordinate with patient later this evening to pick him up from [facility] to take him to [place]

## 2023-03-21 NOTE — CASE MANAGEMENT
CM met with patient and discussed discharge plan  Patient informed CM that he will now discharge to [facility] ex-girlfriend's home in [facility] until his sister in law can pick him up from there this evening

## 2023-03-21 NOTE — TREATMENT TEAM
03/20/23 1230   Activity/Group Checklist   Group Life Skills   Attendance Attended   Attendance Duration (min) 0-15   Interactions Did not interact   Affect/Mood Calm   Goals Achieved Able to listen to others

## 2023-03-21 NOTE — TREATMENT TEAM
03/20/23 1500   Activity/Group Checklist   Group Other (Comment)  (goals group)   Attendance Attended   Attendance Duration (min) 0-15   Interactions Did not interact   Affect/Mood Calm   Goals Achieved Able to listen to others

## 2023-03-21 NOTE — NURSING NOTE
At 2127, patient requested Trazodone 50 mg PO for "sleep"  At [id number], patient appears to be asleep in bed  No
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
## 2023-03-21 NOTE — NURSING NOTE
Language line utilized [id number]  Patient expressed readiness for discharge  Plans to discharge to his girlfriends and later this evening leave to stay with his brother and sister in law  States no issues with the relationship between him and girlfriend and he is looking forward to going home and hugging her  Denies SI/HI and hallucinations  No medications upon discharge  Discussed suicide hotline 65 and reviewed crisis numbers  Smiling and pleasant in conversation  No questions or concerns

## 2023-09-06 NOTE — TREATMENT TEAM
03/21/23 0937   Activity/Group Checklist   Group Community meeting   Attendance Attended   Attendance Duration (min) 0-15   Interactions Did not interact  (Language barrier)   Affect/Mood Appropriate   Goals Achieved Other (Comment)  (Did not interact) Never